# Patient Record
Sex: FEMALE | Race: WHITE | URBAN - METROPOLITAN AREA
[De-identification: names, ages, dates, MRNs, and addresses within clinical notes are randomized per-mention and may not be internally consistent; named-entity substitution may affect disease eponyms.]

---

## 2019-09-17 ENCOUNTER — TELEPHONE (OUTPATIENT)
Dept: SURGERY | Facility: CLINIC | Age: 44
End: 2019-09-17

## 2019-09-17 NOTE — TELEPHONE ENCOUNTER
Patient is 11 years (9/4[birth year]10) s/p Open DS with Dr. Chamorro.  Patient's last noted [facility]up [facility] was 1/25/12 with Violet Flores NP.  Attempted to contact patient over the phone regarding post-op status since last noted follow-up.  Unfortunately phone number listed is incorrect.  No other phone number listed to contact patient at this time.